# Patient Record
Sex: MALE
[De-identification: names, ages, dates, MRNs, and addresses within clinical notes are randomized per-mention and may not be internally consistent; named-entity substitution may affect disease eponyms.]

---

## 2023-08-24 ENCOUNTER — NON-APPOINTMENT (OUTPATIENT)
Age: 38
End: 2023-08-24

## 2023-08-24 ENCOUNTER — APPOINTMENT (OUTPATIENT)
Dept: NEUROLOGY | Facility: CLINIC | Age: 38
End: 2023-08-24
Payer: MEDICAID

## 2023-08-24 VITALS
OXYGEN SATURATION: 97 % | DIASTOLIC BLOOD PRESSURE: 75 MMHG | HEIGHT: 75 IN | TEMPERATURE: 97.8 F | WEIGHT: 260 LBS | BODY MASS INDEX: 32.33 KG/M2 | HEART RATE: 65 BPM | SYSTOLIC BLOOD PRESSURE: 108 MMHG | RESPIRATION RATE: 16 BRPM

## 2023-08-24 PROBLEM — Z00.00 ENCOUNTER FOR PREVENTIVE HEALTH EXAMINATION: Status: ACTIVE | Noted: 2023-08-24

## 2023-08-24 PROCEDURE — 99205 OFFICE O/P NEW HI 60 MIN: CPT

## 2023-08-24 NOTE — HISTORY OF PRESENT ILLNESS
[FreeTextEntry1] : FRANKIE SCHULTE is a 38 year who presents with muscle problems  he has been having rashes on his face for 10 months.    there has also been some weakness in the last year.  worst in arms and legs.  also a lot of muscle soreness. worst weakness is climbing stairs or long walks. typically will need to rest for 15 minutes to be able to walk more normally again. he does not have much to add to the history.  he has been getting treatment via rheumatology as outlined below  after I reviewed all about his muscle disease, I asked his mother from the waiting room about referral and she explained that it is because of his cognitive and affective changes. this has been occurring over a several year period much before the muscle issues. 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 prior to that was a .  he has very little to say about his cognitive changes.  first ever issues were in 2015. That happened over the course of months.  no episodes concerning for seizure. at night might have some dystonic posturing in fingers and toes of all limbs and knees.  typically is a few minutes but has been days at times.   copied from [facility] rheumatology note: from HISTORY: Patient started to have weakness since 2020 that progressed to the point that he had trouble getting up from the chair and lifting arms above the shoulders -- power 3/5 LE. Also had rash on face and abdomen. Autoimmune work up + SAMANTHA, SSA. Myositis work up positive for elevated CK, aldolase, Myomarker panel +ve [id number], [id number]. Started on steroids(11/2022) with which CK improved. MRI b/l lower extremities (12/2022) with atrophy, no inflammation (but done after 1 month of steroids). Started on MMF(12/2022) with improvement of lower extremity strength.    Seen by Heme onc for LAD -- low suspicion for malignancy, no LN biopsy done. Pulmonary evaluated patient for possible IP LN biopsy. Deferred. Plan to repeat CT chest q6m to monitor. There is mild ILD in CT chest.  Interval History: Last seen 4/2023 at which time pateint was feeling well with improvement of strength. Medrol was reduced to 4mg and cellcept maximized to 1.5g BiD. Spoke with pulmonary regarding mild progression of ILD and persistent LAD. At this time recommended continuing with q6m CT chest and no biopsy.   Today: Patient states he stopped taking his medrol on his own and is only taking the cellcept. Patient does not take the medications on the weekends, only takes them 5/7 days in the week. Has not been going for physical therapy sessions.   States he feels the same today, no better/no worse. No worsening shortness of breath, no dysphagia, no new rash.    However on observing patient, he appears to be struggling more than at the last appt to get up from chair unassisted and get onto the exam table. ... FROM PLAN:  #Weakness: Concerning for inflammatory myopathy, however would need to rule out primary vs 2/2 malignancy. Patient has objective weakness, elevated CK/Aldolase, inflammatory markers, SAMANTHA/SSA/RNP. Per heme/onc, low suspicion for lymphoma at this time.  Need to rule out other underlying malignancy as inflammatory myopathy can be a paraneoplastic reaction to malignancy especially considering TIF-1 Ab positive. 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 Would benefit from biopsy to r/o underlying malignancy -- discussed with IP and heme/onc. Repeat CT chest in 5-6 months (8-9/2023) shows persistant LAD and worsening ILD -If ILD continues to progress while on full dose cellcept, will need to consider additional therapy -- will consider rituximab given patient difficulty with compliance to medications -Will repeat labs today -MMF levels -Patient strongly encouraged to go to PT/OT -- patient's mother is very supportive and involved in care, she is willing to assist -Colonoscopy pending for malignancy work up

## 2023-08-24 NOTE — DATA REVIEWED
[de-identified] : Labs prior to starting prednisone (sent at last visit 2/2023) CK: 2,435 (77) Aldolase: 21 (4.7) ESR: 54 (30) CRP: 0.4 (0.2) Myomarker Panel: +ve for Anti-EJ, Anti SSA 52kD, Anti TIF-1 gamma Ab DICK: +ve for SSA and RNP SAMANTHA titre: 1:640   Labs 4/2022 CK: 128 Aldolase: 4.2 ESR: 55 CRP: 0.3 Mycophenolate level: 23 (low<)     MRI thigh: Done 11/22/22 -- no muscle edema, atrophy of obturator and adductors CT Chest: Done 11/14/22  IMPRESSION:   1.  Lung changes as described consistent with interstitial lung disease. These changes are similar to prior exam. 2.  Trace pleural effusions bilaterally. 3.  Mediastinal, hilar and bilateral axillary lymph nodes as described which are smaller than prior exam. 4.  0.6 cm left periaortic lymph node which is slightly smaller than prior exam. 5.  No bowel obstruction.  7/19/2023 ped C and b neg, aldolase 5.7, , ESR 52, CRP 0.5

## 2023-08-24 NOTE — CONSULT LETTER
[Dear  ___] : Dear  [unfilled], [Courtesy Letter:] : I had the pleasure of seeing your patient, [unfilled], in my office today. [Please see my note below.] : Please see my note below. [Consult Closing:] : Thank you very much for allowing me to participate in the care of this patient.  If you have any questions, please do not hesitate to contact me. [Sincerely,] : Sincerely, [DrDanielle  ___] : Dr. RICK [DrDanielle ___] : Dr. RICK [FreeTextEntry3] : Tarik Louise MD

## 2023-08-24 NOTE — PHYSICAL EXAM
[FreeTextEntry1] : General: this is a pleasant patient in no acute distress  HEENT conjunctiva are normal, no tenderness in head  CV: normal pulses, regular rate and rhythm, no peripheral edema noted  Lungs: breathing is non-labored  abd: soft and non-distended  MSK: SLR:  DENNISE: range of motion: full tinnels:  spurling: Occipital nerve tenderness:  Mental status: Alert and oriented to person, place and time, normal speech and comprehension registers 5/5 x 3, recalls 4/5 with hint 5/5 $5-$1.35 = $3.65 follows embedded command, good naming, good repetition  Cranial Nerves: extra-occular movements in tact without nystagmus, normal saccades and smooth pursuit, Face symmetric and facial strength symmetric, facial sensation symmetric, neck flexion 4+/5  Motor: normal bulk and tone throughout. no abnormal movements.  Full 5/5 strength uppers and lower extremities proximally and distally except R hip flexion 3/5, L hip flexion 4-/5, R knee ext 4/5, L foot dorsi 4/5  Sensory: in tact and symmetric to vibration, light tough, temperature  Cerebellar: normal finger-nose-finger bilaterally  Reflexes: 1+ in the upper and lower extremities and symmetric.  toes are bilaterally downgoing.  Gait: stable, unable to tip toe heel and tandem  Stances: Romberg: normal

## 2023-09-01 ENCOUNTER — TRANSCRIPTION ENCOUNTER (OUTPATIENT)
Age: 38
End: 2023-09-01

## 2023-09-05 LAB
AMPA-R ABCBA: NEGATIVE
AMPHIPHYSIN IGG TITR SER IF: NEGATIVE
ANNOTATION COMMENT IMP: NORMAL
CASPR2-IGG CBA, S: NEGATIVE
CV2 IGG TITR SER: NEGATIVE
GABA-B ABCBA: NEGATIVE
GAD65 AB SER-MCNC: 0.28 NMOL/L
GLIAL NUC TYPE 1 AB TITR SER: NEGATIVE
HU1 AB TITR SER: NEGATIVE
HU2 AB TITR SER IF: NEGATIVE
HU3 AB TITR SER: NEGATIVE
IGLON5 IFA, S: NEGATIVE
IMMUNOLOGIST REVIEW: NORMAL
LGI1-IGG CBA, S: NEGATIVE
NIF IFA, S: NEGATIVE
NMDA-R ABCBA: NEGATIVE
PCA-1 AB TITR SER: NEGATIVE
PCA-2 AB TITR SER: NEGATIVE
PCA-TR AB TITR SER: NEGATIVE

## 2023-11-07 ENCOUNTER — TRANSCRIPTION ENCOUNTER (OUTPATIENT)
Age: 38
End: 2023-11-07

## 2023-11-15 ENCOUNTER — APPOINTMENT (OUTPATIENT)
Dept: NEUROLOGY | Facility: CLINIC | Age: 38
End: 2023-11-15

## 2023-11-16 ENCOUNTER — APPOINTMENT (OUTPATIENT)
Dept: NEUROLOGY | Facility: CLINIC | Age: 38
End: 2023-11-16
Payer: MEDICAID

## 2023-11-16 DIAGNOSIS — R46.89 OTHER SYMPTOMS AND SIGNS INVOLVING COGNITIVE FUNCTIONS AND AWARENESS: ICD-10-CM

## 2023-11-16 DIAGNOSIS — G04.81 OTHER ENCEPHALITIS AND ENCEPHALOMYELITIS: ICD-10-CM

## 2023-11-16 DIAGNOSIS — G72.9 MYOPATHY, UNSPECIFIED: ICD-10-CM

## 2023-11-16 DIAGNOSIS — R41.89 OTHER SYMPTOMS AND SIGNS INVOLVING COGNITIVE FUNCTIONS AND AWARENESS: ICD-10-CM

## 2023-11-16 PROCEDURE — 99214 OFFICE O/P EST MOD 30 MIN: CPT | Mod: 95

## 2024-08-05 ENCOUNTER — TRANSCRIPTION ENCOUNTER (OUTPATIENT)
Age: 39
End: 2024-08-05

## 2024-08-07 ENCOUNTER — TRANSCRIPTION ENCOUNTER (OUTPATIENT)
Age: 39
End: 2024-08-07

## 2024-08-09 ENCOUNTER — APPOINTMENT (OUTPATIENT)
Dept: NEUROLOGY | Age: 39
End: 2024-08-09

## 2024-08-09 ENCOUNTER — TRANSCRIPTION ENCOUNTER (OUTPATIENT)
Age: 39
End: 2024-08-09

## 2024-08-12 ENCOUNTER — APPOINTMENT (OUTPATIENT)
Dept: INTERVENTIONAL RADIOLOGY/VASCULAR | Facility: HOSPITAL | Age: 39
End: 2024-08-12